# Patient Record
Sex: MALE | URBAN - METROPOLITAN AREA
[De-identification: names, ages, dates, MRNs, and addresses within clinical notes are randomized per-mention and may not be internally consistent; named-entity substitution may affect disease eponyms.]

---

## 2017-11-09 ENCOUNTER — APPOINTMENT (RX ONLY)
Dept: URBAN - METROPOLITAN AREA OTHER 12 | Facility: OTHER | Age: 22
Setting detail: DERMATOLOGY
End: 2017-11-09

## 2017-11-09 DIAGNOSIS — L85.3 XEROSIS CUTIS: ICD-10-CM

## 2017-11-09 DIAGNOSIS — L30.9 DERMATITIS, UNSPECIFIED: ICD-10-CM

## 2017-11-09 PROCEDURE — 11100: CPT

## 2017-11-09 PROCEDURE — 99242 OFF/OP CONSLTJ NEW/EST SF 20: CPT | Mod: 25

## 2017-11-09 PROCEDURE — ? TREATMENT REGIMEN

## 2017-11-09 PROCEDURE — ? BIOPSY BY PUNCH METHOD

## 2017-11-09 ASSESSMENT — LOCATION DETAILED DESCRIPTION DERM: LOCATION DETAILED: RIGHT INFERIOR LATERAL UPPER BACK

## 2017-11-09 ASSESSMENT — LOCATION ZONE DERM: LOCATION ZONE: TRUNK

## 2017-11-09 ASSESSMENT — LOCATION SIMPLE DESCRIPTION DERM: LOCATION SIMPLE: RIGHT UPPER BACK

## 2017-11-09 NOTE — PROCEDURE: BIOPSY BY PUNCH METHOD
Render Post-Care Instructions In Note?: no
Post-Care Instructions: I reviewed with the patient in detail post-care instructions. Patient is to keep the biopsy site dry overnight, and then apply bacitracin twice daily until healed. Patient may apply hydrogen peroxide soaks to remove any crusting.
Suture Removal: 7 days
Epidermal Sutures: 5-0 Prolene
Hemostasis: None
Punch Size In Mm: 4
Consent: Written consent was obtained and risks were reviewed including but not limited to scarring, infection, bleeding, scabbing, incomplete removal, nerve damage and allergy to anesthesia.
Biopsy Type: H and E
Billing Type: Third-Party Bill
Anesthesia Type: 2% lidocaine with epinephrine
X Depth Of Punch In Cm (Optional): 0
Lab: -[id number]
Detail Level: Detailed
Lab Facility: [id number]
Wound Care: Vaseline
Dressing: bandage
Notification Instructions: Patient will be notified of biopsy results. However, patient instructed to call the office if not contacted within 2 weeks.
Home Suture Removal Text: Patient was provided a home suture removal kit and will remove their sutures at home.  If they have any questions or difficulties they will call the office.
Anesthesia Volume In Cc: 0.5

## 2017-11-21 ENCOUNTER — APPOINTMENT (RX ONLY)
Dept: URBAN - METROPOLITAN AREA OTHER 12 | Facility: OTHER | Age: 22
Setting detail: DERMATOLOGY
End: 2017-11-21

## 2017-11-21 DIAGNOSIS — Z48.02 ENCOUNTER FOR REMOVAL OF SUTURES: ICD-10-CM

## 2017-11-21 DIAGNOSIS — L30.5 PITYRIASIS ALBA: ICD-10-CM

## 2017-11-21 PROCEDURE — ? OTHER

## 2017-11-21 PROCEDURE — 99212 OFFICE O/P EST SF 10 MIN: CPT

## 2017-11-21 PROCEDURE — ? SUTURE REMOVAL (GLOBAL PERIOD)

## 2017-11-21 PROCEDURE — ? TREATMENT REGIMEN

## 2017-11-21 PROCEDURE — ? COUNSELING

## 2017-11-21 ASSESSMENT — LOCATION SIMPLE DESCRIPTION DERM
LOCATION SIMPLE: UPPER BACK
LOCATION SIMPLE: RIGHT UPPER BACK

## 2017-11-21 ASSESSMENT — LOCATION ZONE DERM: LOCATION ZONE: TRUNK

## 2017-11-21 ASSESSMENT — LOCATION DETAILED DESCRIPTION DERM
LOCATION DETAILED: INFERIOR THORACIC SPINE
LOCATION DETAILED: RIGHT INFERIOR UPPER BACK

## 2017-11-21 NOTE — PROCEDURE: OTHER
Other (Free Text): C/W: Lu
Detail Level: Simple
Note Text (......Xxx Chief Complaint.): This diagnosis correlates with the

## 2017-11-21 NOTE — PROCEDURE: SUTURE REMOVAL (GLOBAL PERIOD)
Add 70047 Cpt? (Important Note: In 2017 The Use Of 31532 Is Being Tracked By Cms To Determine Future Global Period Reimbursement For Global Periods): no
Detail Level: Detailed